# Patient Record
Sex: FEMALE | Race: BLACK OR AFRICAN AMERICAN | NOT HISPANIC OR LATINO
[De-identification: names, ages, dates, MRNs, and addresses within clinical notes are randomized per-mention and may not be internally consistent; named-entity substitution may affect disease eponyms.]

---

## 2021-04-13 PROBLEM — Z00.00 ENCOUNTER FOR PREVENTIVE HEALTH EXAMINATION: Status: ACTIVE | Noted: 2021-04-13

## 2021-04-29 ENCOUNTER — APPOINTMENT (OUTPATIENT)
Dept: PLASTIC SURGERY | Facility: CLINIC | Age: 31
End: 2021-04-29
Payer: SELF-PAY

## 2021-04-29 VITALS
SYSTOLIC BLOOD PRESSURE: 110 MMHG | BODY MASS INDEX: 24.71 KG/M2 | RESPIRATION RATE: 16 BRPM | DIASTOLIC BLOOD PRESSURE: 75 MMHG | HEIGHT: 68 IN | WEIGHT: 163 LBS

## 2021-04-29 DIAGNOSIS — Z83.3 FAMILY HISTORY OF DIABETES MELLITUS: ICD-10-CM

## 2021-04-29 DIAGNOSIS — Z78.9 OTHER SPECIFIED HEALTH STATUS: ICD-10-CM

## 2021-04-29 DIAGNOSIS — F17.210 NICOTINE DEPENDENCE, CIGARETTES, UNCOMPLICATED: ICD-10-CM

## 2021-04-29 PROCEDURE — 99204 OFFICE O/P NEW MOD 45 MIN: CPT

## 2021-04-29 PROCEDURE — 99072 ADDL SUPL MATRL&STAF TM PHE: CPT

## 2021-05-06 PROBLEM — Z83.3 FAMILY HISTORY OF DIABETES MELLITUS: Status: ACTIVE | Noted: 2021-04-29

## 2021-05-06 PROBLEM — F17.210 CIGARETTE SMOKER: Status: ACTIVE | Noted: 2021-05-06

## 2021-05-06 PROBLEM — Z78.9 NO PERTINENT PAST MEDICAL HISTORY: Status: RESOLVED | Noted: 2021-04-29 | Resolved: 2021-05-06

## 2021-05-06 NOTE — ASSESSMENT
[FreeTextEntry1] : To consider proceeding, we will need a copy of the prior operative report. Additionally, as she has a [facility] Medicaid product, we will need 2 letters of support for surgery. She will also need to quit smoking and have a negative urine cotinine test prior to submitting. She will also need to come back for a sizing appointment and further discussion of expectations.

## 2021-05-06 NOTE — HISTORY OF PRESENT ILLNESS
[FreeTextEntry1] : 30-year-old woman designated male at birth presents for consultation for breast augmentation revision.  She states she had bilateral breast augmentation by Makenzie Cota 5 years ago with the implants placed subglandularly.  She is now unhappy with the appearance of her breasts, stating that she does not feel that they are full enough.  She has been on feminizing hormones for 11 years, medically supervised for 8.  She denies any additional breast pain.  She denies any recent lumps, bumps, or other changes.  She has not had any breast imaging.  She denies any family history of breast cancer.  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  She lives by herself.  She states that her cousin, Colton,who lives 5 blocks away, would be able to assist her after surgery.  She is currently an active cigarette smoker.  She denies any alcohol or other recreational drug use.

## 2021-05-06 NOTE — PHYSICAL EXAM
[de-identified] : NAD.  BMI 24.8. [de-identified] : Normal respiratory effort. [de-identified] : No dominant masses, skin changes, nipple retraction, or palpable axillary lymphadenopathy. SN->N distance is 18.5 cm on the right and 19.5 cm on the left; width is 15 cm on the right and 15 cm on the left; N->IMF is 11.5 cm on the right and 10 cm on the left. There is no ptosis. The right implant is bottomed-out compared to the left,with significant asymmetry.

## 2021-05-20 ENCOUNTER — APPOINTMENT (OUTPATIENT)
Dept: PLASTIC SURGERY | Facility: CLINIC | Age: 31
End: 2021-05-20
Payer: COMMERCIAL

## 2021-05-20 PROCEDURE — 99072 ADDL SUPL MATRL&STAF TM PHE: CPT

## 2021-05-20 PROCEDURE — 99213 OFFICE O/P EST LOW 20 MIN: CPT

## 2021-06-15 NOTE — ASSESSMENT
[FreeTextEntry1] : Pt has persistent gender dysphoria associated with her breasts. After checking bra sizers, she expresses interest in implant upsizing by about 100-150 cc. We will need to review the prior op report and, if it does not have implant documentation, reasonable efforts to be made to obtain it.\par \par The patient will also need to quit nicotine product use and will need a negative urine nicotine test prior to proceeding.

## 2021-06-15 NOTE — PHYSICAL EXAM
[de-identified] : Unchanged exam with vertical dystopia of implants, right lower than left. Grade 1-to-2 capsular contracture.

## 2021-06-15 NOTE — HISTORY OF PRESENT ILLNESS
[FreeTextEntry1] : The patient returns for further discussion. She has 2 letters of assessment, but does not yet have the operative report or the implant documentation.

## 2021-06-15 NOTE — ADDENDUM
[FreeTextEntry1] : 2021-05-27\par \par The patient sent in op report and implant documentation. Currently has 339 cc subpectoral implants. Need her now to quit smoking and obtain a negative urine nicotine test when able.\par \par 2021-06-15\par \par Negative urine nicotine test obtained. Will submit to insurance.

## 2021-06-20 LAB
ANABASINE UR-MCNC: <2 NG/ML
COTININE UR-MCNC: <5 NG/ML
COTININE UR-MCNC: <5 NG/ML
NORNICOTINE UR-MCNC: <2 NG/ML

## 2021-10-22 DIAGNOSIS — Z01.818 ENCOUNTER FOR OTHER PREPROCEDURAL EXAMINATION: ICD-10-CM

## 2021-10-28 ENCOUNTER — APPOINTMENT (OUTPATIENT)
Dept: PLASTIC SURGERY | Facility: AMBULATORY SURGERY CENTER | Age: 31
End: 2021-10-28

## 2021-10-29 ENCOUNTER — APPOINTMENT (OUTPATIENT)
Dept: PLASTIC SURGERY | Facility: AMBULATORY SURGERY CENTER | Age: 31
End: 2021-10-29

## 2021-11-02 LAB — SARS-COV-2 N GENE NPH QL NAA+PROBE: NOT DETECTED

## 2021-11-09 ENCOUNTER — LABORATORY RESULT (OUTPATIENT)
Age: 31
End: 2021-11-09

## 2021-11-11 ENCOUNTER — TRANSCRIPTION ENCOUNTER (OUTPATIENT)
Age: 31
End: 2021-11-11

## 2021-11-12 ENCOUNTER — APPOINTMENT (OUTPATIENT)
Dept: PLASTIC SURGERY | Facility: AMBULATORY SURGERY CENTER | Age: 31
End: 2021-11-12

## 2021-11-12 ENCOUNTER — OUTPATIENT (OUTPATIENT)
Dept: OUTPATIENT SERVICES | Facility: HOSPITAL | Age: 31
LOS: 1 days | Discharge: HOME | End: 2021-11-12
Payer: MEDICAID

## 2021-11-12 ENCOUNTER — TRANSCRIPTION ENCOUNTER (OUTPATIENT)
Age: 31
End: 2021-11-12

## 2021-11-12 ENCOUNTER — RESULT REVIEW (OUTPATIENT)
Age: 31
End: 2021-11-12

## 2021-11-12 PROCEDURE — 88305 TISSUE EXAM BY PATHOLOGIST: CPT | Mod: 26

## 2021-11-12 PROCEDURE — 19328 RMVL INTACT BREAST IMPLANT: CPT | Mod: 50

## 2021-11-12 PROCEDURE — 19325 BREAST AUGMENTATION W/IMPLT: CPT | Mod: 50

## 2021-11-12 PROCEDURE — 88300 SURGICAL PATH GROSS: CPT | Mod: 26,59

## 2021-11-12 RX ORDER — OXYCODONE HYDROCHLORIDE 5 MG/1
1 TABLET ORAL
Qty: 12 | Refills: 0
Start: 2021-11-12 | End: 2021-11-14

## 2021-11-18 ENCOUNTER — APPOINTMENT (OUTPATIENT)
Dept: PLASTIC SURGERY | Facility: CLINIC | Age: 31
End: 2021-11-18
Payer: MEDICAID

## 2021-11-18 DIAGNOSIS — F64.0 TRANSSEXUALISM: ICD-10-CM

## 2021-11-18 PROCEDURE — 99024 POSTOP FOLLOW-UP VISIT: CPT

## 2021-11-22 LAB — SURGICAL PATHOLOGY STUDY: SIGNIFICANT CHANGE UP

## 2021-11-24 PROBLEM — F64.0 GENDER DYSPHORIA IN ADULT: Status: ACTIVE | Noted: 2021-05-06

## 2021-11-24 NOTE — PHYSICAL EXAM
[de-identified] : Improved symmetry.  Incisions intact.  No significant erythema, ecchymoses, or induration.  Nipple sensation absent on right.  Incisions cleansed and Steri-Strips reapplied.

## 2021-11-24 NOTE — HISTORY OF PRESENT ILLNESS
[FreeTextEntry1] : Patient returns 1 week following revision bilateral breast augmentation.  She is pleased with the size of her breasts.  She endorses minimal discomfort.

## 2021-11-24 NOTE — ASSESSMENT
[FreeTextEntry1] : Healing well.  No evidence of infection or collection.  Wound care and activity restrictions reviewed.  Follow-up in 2 weeks for reassessment.

## 2021-12-02 ENCOUNTER — APPOINTMENT (OUTPATIENT)
Dept: PLASTIC SURGERY | Facility: CLINIC | Age: 31
End: 2021-12-02

## 2023-04-17 ENCOUNTER — APPOINTMENT (OUTPATIENT)
Dept: PLASTIC SURGERY | Facility: CLINIC | Age: 33
End: 2023-04-17

## 2023-05-08 ENCOUNTER — APPOINTMENT (OUTPATIENT)
Dept: PLASTIC SURGERY | Facility: CLINIC | Age: 33
End: 2023-05-08

## 2025-08-24 ENCOUNTER — NON-APPOINTMENT (OUTPATIENT)
Age: 35
End: 2025-08-24